# Patient Record
Sex: MALE | Race: WHITE | NOT HISPANIC OR LATINO | Employment: UNEMPLOYED | URBAN - METROPOLITAN AREA
[De-identification: names, ages, dates, MRNs, and addresses within clinical notes are randomized per-mention and may not be internally consistent; named-entity substitution may affect disease eponyms.]

---

## 2024-03-26 ENCOUNTER — CLINICAL SUPPORT (OUTPATIENT)
Dept: RESPIRATORY THERAPY | Facility: HOSPITAL | Age: 49
End: 2024-03-26
Attending: SURGERY
Payer: MEDICAID

## 2024-03-26 DIAGNOSIS — Z12.11 COLON CANCER SCREENING: ICD-10-CM

## 2024-03-26 DIAGNOSIS — Z12.11 COLON CANCER SCREENING: Primary | ICD-10-CM

## 2024-03-26 PROCEDURE — 93010 ELECTROCARDIOGRAM REPORT: CPT | Mod: ,,, | Performed by: INTERNAL MEDICINE

## 2024-03-26 PROCEDURE — 93005 ELECTROCARDIOGRAM TRACING: CPT

## 2024-03-26 RX ORDER — ALBUTEROL SULFATE 90 UG/1
2 AEROSOL, METERED RESPIRATORY (INHALATION) EVERY 6 HOURS PRN
COMMUNITY
Start: 2024-02-28

## 2024-03-26 RX ORDER — FLUTICASONE FUROATE, UMECLIDINIUM BROMIDE AND VILANTEROL TRIFENATATE 100; 62.5; 25 UG/1; UG/1; UG/1
1 POWDER RESPIRATORY (INHALATION) DAILY PRN
COMMUNITY
Start: 2024-02-29

## 2024-03-26 RX ORDER — LOSARTAN POTASSIUM 50 MG/1
50 TABLET ORAL EVERY MORNING
COMMUNITY
Start: 2024-02-28

## 2024-03-26 NOTE — DISCHARGE INSTRUCTIONS
INSTRUCTIONS  AFTER A COLONOSCOPY/EGD    NO DRIVING X 24 HOURS. NOTIFY YOUR DOCTOR WITH     ABDOMINAL PAIN UNRELIEVED BY  PASSING GAS,   FEVER WITHIN 24 HOURS, OR LARGE AMOUNT OF BLEEDING.

## 2024-03-27 ENCOUNTER — ANESTHESIA EVENT (OUTPATIENT)
Dept: SURGERY | Facility: HOSPITAL | Age: 49
End: 2024-03-27
Payer: MEDICAID

## 2024-03-27 LAB
OHS QRS DURATION: 74 MS
OHS QTC CALCULATION: 435 MS

## 2024-03-27 NOTE — ANESTHESIA PREPROCEDURE EVALUATION
03/27/2024  Vida Moses is a 48 y.o., male.      Pre-op Assessment    I have reviewed the Patient Summary Reports.     I have reviewed the Nursing Notes. I have reviewed the NPO Status.   I have reviewed the Medications.     Review of Systems  Anesthesia Hx:             Denies Family Hx of Anesthesia complications.    Denies Personal Hx of Anesthesia complications.                    Social:  Non-Smoker, Alcohol Use       Hematology/Oncology:  Hematology Normal   Oncology Normal                                   EENT/Dental:  EENT/Dental Normal           Cardiovascular:     Hypertension, well controlled              ECG has been reviewed.                          Pulmonary:    Asthma moderate                   Renal/:  Renal/ Normal                 Hepatic/GI:  Hepatic/GI Normal                 Musculoskeletal:  Musculoskeletal Normal                Neurological:  Neurology Normal                                      Endocrine:  Endocrine Normal            Dermatological:  Skin Normal    Psych:  Psychiatric Normal                    Physical Exam  General: Cooperative, Alert and Oriented    Airway:  Mallampati: II   Mouth Opening: Normal  TM Distance: Normal  Tongue: Normal  Neck ROM: Normal ROM    Dental:  Intact        Anesthesia Plan  Type of Anesthesia, risks & benefits discussed:    Anesthesia Type: Gen Natural Airway  Intra-op Monitoring Plan: Standard ASA Monitors  Post Op Pain Control Plan:   (medical reason for not using multimodal pain management)  Induction:  IV  Informed Consent: Informed consent signed with the Patient and all parties understand the risks and agree with anesthesia plan.  All questions answered. Patient consented to blood products? Yes  ASA Score: 3    Ready For Surgery From Anesthesia Perspective.     .

## 2024-03-28 ENCOUNTER — ANESTHESIA (OUTPATIENT)
Dept: SURGERY | Facility: HOSPITAL | Age: 49
End: 2024-03-28
Payer: MEDICAID

## 2024-03-28 ENCOUNTER — HOSPITAL ENCOUNTER (OUTPATIENT)
Facility: HOSPITAL | Age: 49
Discharge: HOME | End: 2024-03-28
Attending: SURGERY | Admitting: SURGERY
Payer: MEDICAID

## 2024-03-28 VITALS
SYSTOLIC BLOOD PRESSURE: 112 MMHG | TEMPERATURE: 98 F | HEIGHT: 65 IN | BODY MASS INDEX: 30.99 KG/M2 | WEIGHT: 186 LBS | OXYGEN SATURATION: 98 % | RESPIRATION RATE: 18 BRPM | DIASTOLIC BLOOD PRESSURE: 68 MMHG | HEART RATE: 72 BPM

## 2024-03-28 DIAGNOSIS — Z12.11 SCREENING FOR COLON CANCER: ICD-10-CM

## 2024-03-28 DIAGNOSIS — Z12.11 SCREEN FOR COLON CANCER: ICD-10-CM

## 2024-03-28 PROCEDURE — 37000009 HC ANESTHESIA EA ADD 15 MINS: Performed by: SURGERY

## 2024-03-28 PROCEDURE — 37000008 HC ANESTHESIA 1ST 15 MINUTES: Performed by: SURGERY

## 2024-03-28 PROCEDURE — 25000003 PHARM REV CODE 250: Performed by: NURSE ANESTHETIST, CERTIFIED REGISTERED

## 2024-03-28 PROCEDURE — D9220A PRA ANESTHESIA: Mod: ,,, | Performed by: NURSE ANESTHETIST, CERTIFIED REGISTERED

## 2024-03-28 PROCEDURE — 63600175 PHARM REV CODE 636 W HCPCS: Performed by: ANESTHESIOLOGY

## 2024-03-28 PROCEDURE — C1773 RET DEV, INSERTABLE: HCPCS | Performed by: SURGERY

## 2024-03-28 PROCEDURE — 63600175 PHARM REV CODE 636 W HCPCS: Performed by: NURSE ANESTHETIST, CERTIFIED REGISTERED

## 2024-03-28 PROCEDURE — 88305 TISSUE EXAM BY PATHOLOGIST: CPT | Performed by: SURGERY

## 2024-03-28 PROCEDURE — 45385 COLONOSCOPY W/LESION REMOVAL: CPT | Performed by: SURGERY

## 2024-03-28 RX ORDER — SODIUM CHLORIDE, SODIUM LACTATE, POTASSIUM CHLORIDE, CALCIUM CHLORIDE 600; 310; 30; 20 MG/100ML; MG/100ML; MG/100ML; MG/100ML
INJECTION, SOLUTION INTRAVENOUS CONTINUOUS
Status: ACTIVE | OUTPATIENT
Start: 2024-03-28

## 2024-03-28 RX ORDER — LIDOCAINE HYDROCHLORIDE 20 MG/ML
INJECTION INTRAVENOUS
Status: DISCONTINUED | OUTPATIENT
Start: 2024-03-28 | End: 2024-03-28

## 2024-03-28 RX ORDER — PROPOFOL 10 MG/ML
VIAL (ML) INTRAVENOUS
Status: DISCONTINUED | OUTPATIENT
Start: 2024-03-28 | End: 2024-03-28

## 2024-03-28 RX ORDER — FENTANYL CITRATE 50 UG/ML
INJECTION, SOLUTION INTRAMUSCULAR; INTRAVENOUS
Status: DISCONTINUED | OUTPATIENT
Start: 2024-03-28 | End: 2024-03-28

## 2024-03-28 RX ORDER — SODIUM CHLORIDE 9 MG/ML
INJECTION, SOLUTION INTRAVENOUS CONTINUOUS
Status: CANCELLED | OUTPATIENT
Start: 2024-03-28

## 2024-03-28 RX ADMIN — PROPOFOL 50 MG: 10 INJECTION, EMULSION INTRAVENOUS at 12:03

## 2024-03-28 RX ADMIN — SODIUM CHLORIDE, POTASSIUM CHLORIDE, SODIUM LACTATE AND CALCIUM CHLORIDE: 600; 310; 30; 20 INJECTION, SOLUTION INTRAVENOUS at 11:03

## 2024-03-28 RX ADMIN — PROPOFOL 100 MG: 10 INJECTION, EMULSION INTRAVENOUS at 12:03

## 2024-03-28 RX ADMIN — LIDOCAINE HYDROCHLORIDE 60 MG: 20 INJECTION, SOLUTION INTRAVENOUS at 12:03

## 2024-03-28 RX ADMIN — FENTANYL CITRATE 100 MCG: 50 INJECTION, SOLUTION INTRAMUSCULAR; INTRAVENOUS at 12:03

## 2024-03-28 NOTE — OP NOTE
[facility] - Periop Services  Operative Note      Date of Procedure: 3/28/2024     Procedure: Procedure(s) (LRB):  COLONOSCOPY (N/A)  COLONOSCOPY, WITH POLYPECTOMY USING SNARE (N/A)     Surgeon(s) and Role:     * Terry Moctezuma MD - Primary    Assisting Surgeon: None    Pre-Operative Diagnosis: Screen for colon cancer [Z12.11]    Post-Operative Diagnosis: Post-Op Diagnosis Codes:     * Screen for colon cancer [Z12.11]  Ileocecal valve visualized but not intubated normal cecum  Polyp in the ascending colon removed with a hot loop snare clear margin obtained  Polyp at 80 cm removed with a hot loop snare on retrieved macerated within the scope  Polyp at 60 cm removed with a hot loop snare clear margin obtained  Polyp at 15 cm removed with a hot loop snare clear margin obtained  Grade 1-2 internal hemorrhoids  Anesthesia: General    Operative Findings (including complications, if any):  Patient is a 48-year-old  male with a history of a heart attack with pericarditis in 2015 has hypertension hypercholesterolemia and right neck pain on light duty presently patient has asthma and recently had need for age-appropriate screening colonoscopy.  Patient underwent cardiac clearance preop and had colonoscopy done to the cecum without intubation of ileocecal valve     Cecum was normal   Ascending colon was with a polyp that was removed with a hot loop snare clear margin obtained   Patient's hepatic flexure and transverse colon was normal   Patient had 80 cm had a polyp that was removed with a hot loop snare clear margin was obtained  Polyp at 60 cm removed with a hot loop snare clear margin obtained   Polyp at 15 cm removed with a hot loop snare clear margin obtained  Descending colon was normal  Sigmoid colon was with minimal diverticular disease   Grade 2 internal hemorrhoids were seen no other pathology visualized    Plan  1. Follow up in the office in a week to discuss results  2. Repeat stool  for blood in 2 years   3. Follow up colonoscopy due to history of polyp formation in 3 years            Description of Technical Procedures:  Noted above       Significant Surgical Tasks Conducted by the Assistant(s), if Applicable:  None       Estimated Blood Loss (EBL): 0 mL           Implants: * No implants in log *    Specimens:   Specimen (24h ago, onward)       Start     Ordered    03/28/24 1306  Specimen to Pathology  RELEASE UPON ORDERING        References:    Click here for ordering Quick Tip   Question:  Release to patient  Answer:  Immediate    03/28/24 1306                            Condition: Good    Disposition: PACU - hemodynamically stable.    Attestation: I was present and scrubbed for the entire procedure.    Discharge Note    OUTCOME: Patient tolerated treatment/procedure well without complication and is now ready for discharge.    DISPOSITION: Home or Self Care    FINAL DIAGNOSIS:    Cecum was normal   Ascending colon was with a polyp that was removed with a hot loop snare clear margin obtained   Patient's hepatic flexure and transverse colon was normal   Patient had 80 cm had a polyp that was removed with a hot loop snare clear margin was obtained  Polyp at 60 cm removed with a hot loop snare clear margin obtained   Polyp at 15 cm removed with a hot loop snare clear margin obtained  Descending colon was normal  Sigmoid colon was with minimal diverticular disease   Grade 2 internal hemorrhoids were seen no other pathology visualized    Plan  FOLLOWUP: In clinic 1 week    DISCHARGE INSTRUCTIONS:  No discharge procedures on file.     Clinical Reference Documents Added to Patient Instructions         Document    COLONOSCOPY DISCHARGE INSTRUCTIONS (ENGLISH)    MODERATE SEDATION IN ADULTS DISCHARGE INSTRUCTIONS (ENGLISH)

## 2024-03-28 NOTE — DISCHARGE SUMMARY
[facility] - Periop Services  Discharge Note  Short Stay    Procedure(s) (LRB):  COLONOSCOPY (N/A)  COLONOSCOPY, WITH POLYPECTOMY USING SNARE (N/A)      OUTCOME: Patient tolerated treatment/procedure well without complication and is now ready for discharge.    DISPOSITION: Home or Self Care    FINAL DIAGNOSIS:  <principal problem not specified>  Cecum was normal   Ascending colon was with a polyp that was removed with a hot loop snare clear margin obtained   Patient's hepatic flexure and transverse colon was normal   Patient had 80 cm had a polyp that was removed with a hot loop snare clear margin was obtained  Polyp at 60 cm removed with a hot loop snare clear margin obtained   Polyp at 15 cm removed with a hot loop snare clear margin obtained  Descending colon was normal  Sigmoid colon was with minimal diverticular disease   Grade 2 internal hemorrhoids were seen no other pathology visualized    Plan  FOLLOWUP: In clinic 1 week    DISCHARGE INSTRUCTIONS:  No discharge procedures on file.      Clinical Reference Documents Added to Patient Instructions         Document    COLONOSCOPY DISCHARGE INSTRUCTIONS (ENGLISH)    MODERATE SEDATION IN ADULTS DISCHARGE INSTRUCTIONS (ENGLISH)            TIME SPENT ON DISCHARGE:      Five minutes

## 2024-03-28 NOTE — ANESTHESIA POSTPROCEDURE EVALUATION
Anesthesia Post Evaluation    Patient: Vida Moses    Procedure(s) Performed: Procedure(s) (LRB):  COLONOSCOPY (N/A)  COLONOSCOPY, WITH POLYPECTOMY USING SNARE (N/A)    Final Anesthesia Type: general      Patient participation: Yes- Able to Participate  Level of consciousness: awake and alert  Post-procedure vital signs: reviewed and stable  Pain management: adequate  Airway patency: patent    PONV status at discharge: No PONV  Anesthetic complications: no      Cardiovascular status: blood pressure returned to baseline  Respiratory status: unassisted  Hydration status: euvolemic  Follow-up not needed.              Vitals Value Taken Time   /83 03/28/24 1042   Temp 36.6 °C (97.9 °F) 03/28/24 1042   Pulse 95 03/28/24 1042   Resp  03/28/24 1310   SpO2 98 % 03/28/24 1042         No case tracking events are documented in the log.      Pain/Elizabeth Score: No data recorded

## 2024-04-02 LAB — PSYCHE PATHOLOGY RESULT: NORMAL

## (undated) DEVICE — TRAP SUCTION POLYP

## (undated) DEVICE — KIT SURGICAL COLON .25 1.1OZ

## (undated) DEVICE — SNARE POLYP STD SNG 7FR

## (undated) DEVICE — PAD ELECTROSURGICAL SPL W/CORD